# Patient Record
Sex: MALE | Race: WHITE | Employment: FULL TIME | URBAN - METROPOLITAN AREA
[De-identification: names, ages, dates, MRNs, and addresses within clinical notes are randomized per-mention and may not be internally consistent; named-entity substitution may affect disease eponyms.]

---

## 2022-08-10 ENCOUNTER — HOSPITAL ENCOUNTER (EMERGENCY)
Age: 27
Discharge: HOME | End: 2022-08-11
Attending: EMERGENCY MEDICINE

## 2022-08-10 DIAGNOSIS — I48.91 ATRIAL FIBRILLATION WITH RAPID VENTRICULAR RESPONSE (HCC): Primary | ICD-10-CM

## 2022-08-10 DIAGNOSIS — R42 LIGHTHEADEDNESS: ICD-10-CM

## 2022-08-10 LAB
BASOPHILS ABSOLUTE: 0.1 K/UL (ref 0–0.2)
BASOPHILS RELATIVE PERCENT: 0.9 %
EOSINOPHILS ABSOLUTE: 0.2 K/UL (ref 0–0.6)
EOSINOPHILS RELATIVE PERCENT: 1.6 %
HCT VFR BLD CALC: 48.6 % (ref 40.5–52.5)
HEMOGLOBIN: 17.2 G/DL (ref 13.5–17.5)
LYMPHOCYTES ABSOLUTE: 2.5 K/UL (ref 1–5.1)
LYMPHOCYTES RELATIVE PERCENT: 25.3 %
MCH RBC QN AUTO: 30.6 PG (ref 26–34)
MCHC RBC AUTO-ENTMCNC: 35.4 G/DL (ref 31–36)
MCV RBC AUTO: 86.4 FL (ref 80–100)
MONOCYTES ABSOLUTE: 0.8 K/UL (ref 0–1.3)
MONOCYTES RELATIVE PERCENT: 8.4 %
NEUTROPHILS ABSOLUTE: 6.2 K/UL (ref 1.7–7.7)
NEUTROPHILS RELATIVE PERCENT: 63.8 %
PDW BLD-RTO: 12.7 % (ref 12.4–15.4)
PLATELET # BLD: 231 K/UL (ref 135–450)
PMV BLD AUTO: 7.1 FL (ref 5–10.5)
RBC # BLD: 5.62 M/UL (ref 4.2–5.9)
WBC # BLD: 9.8 K/UL (ref 4–11)

## 2022-08-10 PROCEDURE — 96374 THER/PROPH/DIAG INJ IV PUSH: CPT

## 2022-08-10 PROCEDURE — 99285 EMERGENCY DEPT VISIT HI MDM: CPT

## 2022-08-10 PROCEDURE — 85025 COMPLETE CBC W/AUTO DIFF WBC: CPT

## 2022-08-10 PROCEDURE — 93005 ELECTROCARDIOGRAM TRACING: CPT | Performed by: EMERGENCY MEDICINE

## 2022-08-10 PROCEDURE — 84484 ASSAY OF TROPONIN QUANT: CPT

## 2022-08-10 PROCEDURE — 84443 ASSAY THYROID STIM HORMONE: CPT

## 2022-08-10 PROCEDURE — 96361 HYDRATE IV INFUSION ADD-ON: CPT

## 2022-08-10 PROCEDURE — 82077 ASSAY SPEC XCP UR&BREATH IA: CPT

## 2022-08-10 PROCEDURE — 80053 COMPREHEN METABOLIC PANEL: CPT

## 2022-08-10 PROCEDURE — 36415 COLL VENOUS BLD VENIPUNCTURE: CPT

## 2022-08-10 PROCEDURE — 83735 ASSAY OF MAGNESIUM: CPT

## 2022-08-10 ASSESSMENT — PAIN - FUNCTIONAL ASSESSMENT: PAIN_FUNCTIONAL_ASSESSMENT: NONE - DENIES PAIN

## 2022-08-10 NOTE — Clinical Note
Aminah Merino was seen and treated in our emergency department on 8/10/2022. He may return to work on 08/13/2022. May return sooner if feeling better      If you have any questions or concerns, please don't hesitate to call.       Tika Tiwari MD

## 2022-08-10 NOTE — Clinical Note
Dima Campa was seen and treated in our emergency department on 8/10/2022. He may return to work on 08/13/2022. May return sooner if feeling better      If you have any questions or concerns, please don't hesitate to call.       Dimple Mitchell MD

## 2022-08-11 ENCOUNTER — APPOINTMENT (OUTPATIENT)
Dept: GENERAL RADIOLOGY | Age: 27
End: 2022-08-11

## 2022-08-11 VITALS
RESPIRATION RATE: 16 BRPM | SYSTOLIC BLOOD PRESSURE: 129 MMHG | HEART RATE: 82 BPM | TEMPERATURE: 98.5 F | DIASTOLIC BLOOD PRESSURE: 82 MMHG | OXYGEN SATURATION: 98 % | HEIGHT: 71 IN | WEIGHT: 200 LBS | BODY MASS INDEX: 28 KG/M2

## 2022-08-11 LAB
A/G RATIO: 1.9 (ref 1.1–2.2)
ALBUMIN SERPL-MCNC: 5 G/DL (ref 3.4–5)
ALP BLD-CCNC: 96 U/L (ref 40–129)
ALT SERPL-CCNC: 49 U/L (ref 10–40)
ANION GAP SERPL CALCULATED.3IONS-SCNC: 13 MMOL/L (ref 3–16)
AST SERPL-CCNC: 27 U/L (ref 15–37)
BILIRUB SERPL-MCNC: 1 MG/DL (ref 0–1)
BUN BLDV-MCNC: 13 MG/DL (ref 7–20)
CALCIUM SERPL-MCNC: 10 MG/DL (ref 8.3–10.6)
CHLORIDE BLD-SCNC: 105 MMOL/L (ref 99–110)
CO2: 24 MMOL/L (ref 21–32)
CREAT SERPL-MCNC: 1 MG/DL (ref 0.9–1.3)
EKG ATRIAL RATE: 159 BPM
EKG DIAGNOSIS: NORMAL
EKG Q-T INTERVAL: 302 MS
EKG QRS DURATION: 86 MS
EKG QTC CALCULATION (BAZETT): 475 MS
EKG R AXIS: 63 DEGREES
EKG T AXIS: 14 DEGREES
EKG VENTRICULAR RATE: 149 BPM
ETHANOL: NORMAL MG/DL (ref 0–0.08)
GFR AFRICAN AMERICAN: >60
GFR NON-AFRICAN AMERICAN: >60
GLUCOSE BLD-MCNC: 103 MG/DL (ref 70–99)
MAGNESIUM: 2.2 MG/DL (ref 1.8–2.4)
POTASSIUM REFLEX MAGNESIUM: 3.7 MMOL/L (ref 3.5–5.1)
SODIUM BLD-SCNC: 142 MMOL/L (ref 136–145)
TOTAL PROTEIN: 7.7 G/DL (ref 6.4–8.2)
TROPONIN: <0.01 NG/ML
TSH SERPL DL<=0.05 MIU/L-ACNC: 4.73 UIU/ML (ref 0.27–4.2)

## 2022-08-11 PROCEDURE — 2500000003 HC RX 250 WO HCPCS: Performed by: EMERGENCY MEDICINE

## 2022-08-11 PROCEDURE — 6370000000 HC RX 637 (ALT 250 FOR IP): Performed by: EMERGENCY MEDICINE

## 2022-08-11 PROCEDURE — 71045 X-RAY EXAM CHEST 1 VIEW: CPT

## 2022-08-11 PROCEDURE — 2580000003 HC RX 258: Performed by: EMERGENCY MEDICINE

## 2022-08-11 PROCEDURE — 93010 ELECTROCARDIOGRAM REPORT: CPT | Performed by: INTERNAL MEDICINE

## 2022-08-11 PROCEDURE — 99285 EMERGENCY DEPT VISIT HI MDM: CPT

## 2022-08-11 RX ORDER — METOPROLOL TARTRATE 50 MG/1
50 TABLET, FILM COATED ORAL ONCE
Status: COMPLETED | OUTPATIENT
Start: 2022-08-11 | End: 2022-08-11

## 2022-08-11 RX ORDER — ASPIRIN 81 MG/1
324 TABLET, CHEWABLE ORAL ONCE
Status: COMPLETED | OUTPATIENT
Start: 2022-08-11 | End: 2022-08-11

## 2022-08-11 RX ORDER — ASPIRIN 81 MG/1
81 TABLET, CHEWABLE ORAL DAILY
Qty: 30 TABLET | Refills: 0 | Status: SHIPPED | OUTPATIENT
Start: 2022-08-11 | End: 2022-09-10

## 2022-08-11 RX ORDER — DILTIAZEM HYDROCHLORIDE 5 MG/ML
10 INJECTION INTRAVENOUS ONCE
Status: COMPLETED | OUTPATIENT
Start: 2022-08-11 | End: 2022-08-11

## 2022-08-11 RX ORDER — 0.9 % SODIUM CHLORIDE 0.9 %
1000 INTRAVENOUS SOLUTION INTRAVENOUS ONCE
Status: COMPLETED | OUTPATIENT
Start: 2022-08-11 | End: 2022-08-11

## 2022-08-11 RX ADMIN — SODIUM CHLORIDE 1000 ML: 9 INJECTION, SOLUTION INTRAVENOUS at 01:10

## 2022-08-11 RX ADMIN — METOPROLOL TARTRATE 50 MG: 50 TABLET, FILM COATED ORAL at 02:15

## 2022-08-11 RX ADMIN — ASPIRIN 324 MG: 81 TABLET, CHEWABLE ORAL at 03:14

## 2022-08-11 RX ADMIN — DILTIAZEM HYDROCHLORIDE 10 MG: 5 INJECTION INTRAVENOUS at 01:09

## 2022-08-11 ASSESSMENT — ENCOUNTER SYMPTOMS
SHORTNESS OF BREATH: 0
VOMITING: 0
CHEST TIGHTNESS: 0
COUGH: 0
COLOR CHANGE: 0
CONSTIPATION: 0
ABDOMINAL PAIN: 0
DIARRHEA: 1
NAUSEA: 0
ABDOMINAL DISTENTION: 0

## 2022-08-11 ASSESSMENT — PAIN - FUNCTIONAL ASSESSMENT: PAIN_FUNCTIONAL_ASSESSMENT: NONE - DENIES PAIN

## 2022-08-11 NOTE — ED NOTES
Called cardiology at [address] [place] [address]. Duane Price is supposed to return call.       Morena Toth  43/78/90 0235

## 2022-08-11 NOTE — ED PROVIDER NOTES
[address] ED  EMERGENCY DEPARTMENT ENCOUNTER        Pt Name: Fatimah Holt  MRN: [id number]  [place] 1995  Date of evaluation: 8/10/2022  Provider: Ismael Bangura MD  PCP: Lucho Espinal MD      CHIEF COMPLAINT       Chief Complaint   Patient presents with    Irregular Heart Beat     Pt noticed fast HR that started today. EKG shows Afib RVR in triage. No hx of Afib. HISTORY OFPRESENT ILLNESS   (Location/Symptom, Timing/Onset, Context/Setting, Quality, Duration, Modifying Factors,Severity)  Note limiting factors. Fatimah Holt is a 33 y.o. male presenting today due to concern for noticing that his heart was racing throughout the entire working on his car although not realizing something was abnormal until roughly 10 PM this evening and reportedly being on his car for 4 hours prior to that. He does not think that the sensation of his heart fluttering happen any sooner than today. He denies any history of atrial fibrillation. 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 He reports having a grandfather with a bicuspid aortic valve. He did admit to drinking 6 beers yesterday on his off day but denies drinking on a regular basis. He denies any chest pain or shortness of breath but did feel lightheaded when standing prior to coming to the ED. He denies any syncope. He denies any numbness or weakness in the arms or legs. He denies any vomiting. He denies any headache. He did have some diarrhea recently. He denies any fever. Due to overall not feeling well this evening with the palpitations, he came to the ED for further evaluation. REVIEW OF SYSTEMS    (2-9 systems for level 4, 10 or more for level 5)     Review of Systems   Constitutional:  Negative for chills, diaphoresis, fatigue and fever. HENT:  Negative for congestion. Eyes:  Negative for visual disturbance.    Respiratory:  Negative for cough, chest tightness and shortness of breath. Cardiovascular:  Positive for palpitations. Negative for chest pain and leg swelling. Gastrointestinal:  Positive for diarrhea. Negative for abdominal distention, abdominal pain, constipation, nausea and vomiting. Genitourinary:  Negative for dysuria. Musculoskeletal:  Negative for neck pain. Skin:  Negative for color change and wound. Neurological:  Positive for light-headedness. Negative for dizziness, syncope, weakness, numbness and headaches. Psychiatric/Behavioral:  Negative for confusion. The patient is not nervous/anxious. Positives and Pertinent negatives as per HPI. PASTMEDICAL HISTORY   History reviewed. No pertinent past medical history. SURGICAL HISTORY       Past Surgical History:   Procedure Laterality Date    ANKLE SURGERY Right          CURRENT MEDICATIONS       Discharge Medication List as of 8/11/2022  3:11 AM          ALLERGIES     Diltiazem hcl    FAMILY HISTORY     History reviewed. No pertinent family history. SOCIAL HISTORY       Social History     Socioeconomic History    Marital status: Single     Spouse name: None    Number of children: None    Years of education: None    Highest education level: None   Tobacco Use    Smoking status: Never    Smokeless tobacco: Current   Vaping Use    Vaping Use: Never used   Substance and Sexual Activity    Alcohol use: No    Drug use: No       SCREENINGS    [place] Coma Scale  Eye Opening: Spontaneous  Best Verbal Response: Oriented  Best Motor Response: Obeys commands  [place] Coma Scale Score: 15           PHYSICAL EXAM    (up to 7 for level 4, 8 or more for level 5)     ED Triage Vitals [08/10/22 2315]   BP Temp Temp Source Heart Rate Resp SpO2 Height Weight   121/83 98.5 °F (36.9 °C) Oral (!) 149 18 98 % 5' 11\" (1.803 m) 200 lb (90.7 kg)       Physical Exam  Vitals and nursing note reviewed. Constitutional:       General: He is not in acute distress.      Appearance: Normal appearance. He is well-developed. He is not ill-appearing, toxic-appearing or diaphoretic. Interventions: He is not intubated. HENT:      Head: Normocephalic and atraumatic. Right Ear: External ear normal.      Left Ear: External ear normal.      Nose: Nose normal.      Mouth/Throat:      Mouth: Mucous membranes are moist.   Eyes:      General: No scleral icterus. Right eye: No discharge. Left eye: No discharge. Conjunctiva/sclera: Conjunctivae normal.      Pupils: Pupils are equal, round, and reactive to light. Neck:      Trachea: Trachea and phonation normal. No tracheal deviation. Cardiovascular:      Rate and Rhythm: Tachycardia present. Rhythm irregularly irregular. Pulses: Normal pulses. Heart sounds: Normal heart sounds. No murmur heard. No friction rub. No gallop. Pulmonary:      Effort: Pulmonary effort is normal. No tachypnea, bradypnea, accessory muscle usage, prolonged expiration, respiratory distress or retractions. He is not intubated. Breath sounds: Normal breath sounds and air entry. No stridor. No decreased breath sounds, wheezing, rhonchi or rales. Chest:      Chest wall: No tenderness. Abdominal:      General: Abdomen is flat. Bowel sounds are normal. There is no distension. Palpations: Abdomen is soft. Tenderness: There is no abdominal tenderness. There is no guarding or rebound. Musculoskeletal:         General: No tenderness, deformity or signs of injury. Normal range of motion. Cervical back: Full passive range of motion without pain, normal range of motion and neck supple. No edema, erythema, signs of trauma, rigidity, torticollis or crepitus. No pain with movement, spinous process tenderness or muscular tenderness. Normal range of motion. Right lower leg: No edema. Left lower leg: No edema. Skin:     General: Skin is warm and dry. Coloration: Skin is not jaundiced or pale.       Findings: No erythema or rash. Neurological:      General: No focal deficit present. Mental Status: He is alert and oriented to person, place, and time. Mental status is at baseline. GCS: GCS eye subscore is 4. GCS verbal subscore is 5. GCS motor subscore is 6. Cranial Nerves: No dysarthria or facial asymmetry. Sensory: Sensation is intact. Motor: Motor function is intact. No weakness, tremor, atrophy, abnormal muscle tone or seizure activity. Comments: Moving all extremities equally   Psychiatric:         Attention and Perception: Attention normal.         Mood and Affect: Mood and affect normal. Mood is not anxious. Speech: Speech normal. Speech is not delayed or slurred. Behavior: Behavior normal. Behavior is cooperative. DIAGNOSTIC RESULTS   :    Labs Reviewed   COMPREHENSIVE METABOLIC PANEL W/ REFLEX TO MG FOR LOW K - Abnormal; Notable for the following components:       Result Value    Glucose 103 (*)     ALT 49 (*)     All other components within normal limits   TSH - Abnormal; Notable for the following components:    TSH 4.73 (*)     All other components within normal limits   CBC WITH AUTO DIFFERENTIAL   TROPONIN   MAGNESIUM   ETHANOL       All other labs were within normal range or not returned asof this dictation. EKG: All EKG's are interpreted by the Emergency Department Physician who either signs or Co-signs this chart in the absence of a cardiologist.    The Ekg interpreted by me shows  atrial fibrillation with a rate of 149  Axis is   Normal  QTc is  within an acceptable range  Intervals and Durations are unremarkable.       ST Segments: nonspecific changes  No significant change from prior EKG dated - no old EKG  No STEMI         RADIOLOGY:   Non-plain film images such as CT, Ultrasound and MRI are read by the radiologist. Plainradiographic images are visualized and preliminarily interpreted by the  ED Provider with the belowfindings:        Interpretation per the Radiologist below, if available at the time of this note:    XR CHEST PORTABLE   Final Result   No acute process. PROCEDURES   Unless otherwise noted below, none     Procedures    CRITICAL CARE TIME   Time: 30 minutes  Includes repeat examinations, speaking with consultants, lab interpretation, charting, treating for afib with rvr needing IV fluids and IV diltiazem   Excludes separate billable procedures. Patient at risk for serious decompensation if not treated for this life-threatening condition. CONSULTS: Spoke with NP Filiberto He and he recommended 25 mg metoprolol twice daily but no need for anticoagulation and cardiology will get the patient in urgently into their office over the next couple of days. IP CONSULT TO CARDIOLOGY    EMERGENCY DEPARTMENT COURSE and DIFFERENTIAL DIAGNOSIS/MDM:   Vitals:    Vitals:    08/11/22 0307 08/11/22 0308 08/11/22 0309 08/11/22 0317   BP:    129/82   Pulse: 85 92 84 82   Resp: 19 17  16   Temp:       TempSrc:       SpO2:    98%   Weight:       Height:           Patient was given the following medications:  Medications   dilTIAZem injection 10 mg (10 mg IntraVENous Given 8/11/22 0109)   0.9 % sodium chloride bolus (0 mLs IntraVENous Stopped 8/11/22 0310)   metoprolol tartrate (LOPRESSOR) tablet 50 mg (50 mg Oral Given 8/11/22 0215)   aspirin chewable tablet 324 mg (324 mg Oral Given 8/11/22 0314)     Patient was evaluated due to having racing heart associate with lightheadedness starting this evening. No concern for any strokelike symptoms. He has never had atrial fibrillation before but EKG did confirm this. He did receive IV fluids along with IV diltiazem but after receiving the diltiazem he did have signs of streaking erythema up the arm concerning for possible allergic reaction. He had no concern for angioedema or other allergic reaction symptoms and it did resolve once the medication stopped.   I did speak to cardiology who agreed that he can be discharged with close follow-up as an outpatient and no need for admission. They did recommend metoprolol. Based on [id number] 2 score, no need for anticoagulation but I did start him on a baby aspirin. He is aware that if he develops any chest pain or shortness of breath, unilateral numbness or weakness, lightheadedness with syncope, then return immediately to the ED for further evaluation, but otherwise follow-up with cardiology over the next couple of days for repeat evaluation. Cardiology does plan to call him in the morning but he was also told to call the office as well today to ensure quick follow-up. He was well-appearing and in no acute distress at time of discharge and felt comfortable with this plan. The patient tolerated their visit well. The patient and / or the family were informed of the results of any tests, a time was given to answer questions. FINAL IMPRESSION      1. Atrial fibrillation with rapid ventricular response ([address]    2. Lightheadedness          DISPOSITION/PLAN   DISPOSITION Decision To Discharge 08/11/2022 03:04:34 AM      PATIENT REFERRED TO:  [facility] [place]  [address] [facility]  [phone]  Go to   If symptoms worsen    Daniel Moreno MD  [address] [address] [phone]    In 2 days      Wilton Smith MD  [address] [place] [address]  [phone]    Call today      DISCHARGEMEDICATIONS:  Discharge Medication List as of 8/11/2022  3:11 AM        START taking these medications    Details   metoprolol tartrate (LOPRESSOR) 25 MG tablet Take 1 tablet by mouth in the morning and 1 tablet before bedtime. Do all this for 14 days. , Disp-28 tablet, R-0Print      aspirin (ASPIRIN CHILDRENS) 81 MG chewable tablet Take 1 tablet by mouth in the morning., Disp-30 tablet, R-0Print             DISCONTINUED MEDICATIONS:  Discharge Medication List as of 8/11/2022  3:11 AM        STOP taking these medications       ibuprofen (ADVIL;MOTRIN) 600 MG tablet Comments:   Reason for Stopping:                      (Please note that portions of this note were completed with a voicerecognition program.  Efforts were made to edit the dictations but occasionally words are mis-transcribed.)    Ani Corral MD (electronically signed)            Ani Corral MD  08/11/22 2011

## 2022-08-11 NOTE — ED NOTES
After receiving IV diltiazem, patient had redness up his arm to his chest. Patient c/o some burning at the site. IV is still patent, flushes well and has a brisk blood return. Dr. Alphonso Tolliver at bedside for evaluation. Dr. Alphonso Tolliver asked that cardizem be added to the patient's list of allergies. IV fluids resumed, redness is improving.       Nohemi Johnston, ALEKSANDRA  08/11/22 0151

## 2022-08-11 NOTE — ED NOTES
Patient reports his heart rate increased to 150, he felt lightheaded and SOB. Patient heart rate remains irregular and fluctuates between 120-150 bpm. Dr. Patel Haddad made aware of patient complaint.       Jennifer Wooten RN  08/11/22 0020

## 2022-08-24 ENCOUNTER — TELEPHONE (OUTPATIENT)
Dept: CARDIOLOGY CLINIC | Age: 27
End: 2022-08-24

## 2022-08-24 NOTE — TELEPHONE ENCOUNTER
From [address] [place],[address]- Please help me get patient 2 week monitor and follow up with EPMD.  Thanks. Attempted to reach patient. LVM for call back. Need to know if he would like monitor mailed or to come in to have placed.      Also schedule with [facility] as a new patient in 1-2 months when he calls back

## 2022-08-24 NOTE — TELEPHONE ENCOUNTER
----- Message from Adriano Ramirez MD sent at 8/24/2022 12:01 AM EDT -----        ----- Message -----  From: Dariela Burt MD  Sent: 8/11/2022   3:26 AM EDT  To: Adriano Ramirez MD

## 2022-10-14 NOTE — TELEPHONE ENCOUNTER
Attempted to call pt to relay message. Pt answered the phone, but after introducing myself pt promptly hung up the phone. Given our multiple attempts to contact pt, I will close encounter.

## 2023-05-05 ENCOUNTER — HOSPITAL ENCOUNTER (EMERGENCY)
Age: 28
Discharge: HOME | End: 2023-05-05
Attending: STUDENT IN AN ORGANIZED HEALTH CARE EDUCATION/TRAINING PROGRAM
Payer: COMMERCIAL

## 2023-05-05 VITALS
HEIGHT: 71 IN | SYSTOLIC BLOOD PRESSURE: 159 MMHG | RESPIRATION RATE: 16 BRPM | TEMPERATURE: 98.5 F | HEART RATE: 71 BPM | WEIGHT: 220 LBS | DIASTOLIC BLOOD PRESSURE: 99 MMHG | OXYGEN SATURATION: 96 % | BODY MASS INDEX: 30.8 KG/M2

## 2023-05-05 DIAGNOSIS — J01.10 ACUTE FRONTAL SINUSITIS, RECURRENCE NOT SPECIFIED: ICD-10-CM

## 2023-05-05 DIAGNOSIS — H66.90 ACUTE OTITIS MEDIA, UNSPECIFIED OTITIS MEDIA TYPE: Primary | ICD-10-CM

## 2023-05-05 PROCEDURE — 99283 EMERGENCY DEPT VISIT LOW MDM: CPT

## 2023-05-05 RX ORDER — GUAIFENESIN 600 MG/1
600 TABLET, EXTENDED RELEASE ORAL 2 TIMES DAILY
Qty: 30 TABLET | Refills: 0 | Status: SHIPPED | OUTPATIENT
Start: 2023-05-05 | End: 2023-05-20

## 2023-05-05 RX ORDER — AMOXICILLIN AND CLAVULANATE POTASSIUM 875; 125 MG/1; MG/1
1 TABLET, FILM COATED ORAL 2 TIMES DAILY
Qty: 14 TABLET | Refills: 0 | Status: SHIPPED | OUTPATIENT
Start: 2023-05-05 | End: 2023-05-12

## 2023-05-05 ASSESSMENT — PAIN - FUNCTIONAL ASSESSMENT: PAIN_FUNCTIONAL_ASSESSMENT: 0-10

## 2023-05-05 ASSESSMENT — PAIN SCALES - GENERAL: PAINLEVEL_OUTOF10: 0

## 2023-05-05 NOTE — DISCHARGE INSTRUCTIONS
You were seen today for ear pain and sinus pressure. As we discussed, ear infections are often viral in nature. You have been provided a paper prescription for an antibiotic. We recommend that if your symptoms are not improving within the next couple days or you are having worsening symptoms that you start the antibiotic.   You have also been prescribed a decongestant

## 2023-05-05 NOTE — ED NOTES
Pt ok to d/c to home. Pt given d/c instructions. Pt verbalized understating including Rx and follow up care. Pt ambulated to [facility] for ride home.  0 s/s of distress at time of d/c.          Chioma Alvarez RN  05/05/23 4339

## 2023-05-08 NOTE — ED PROVIDER NOTES
symptoms occur. We have discussed the symptoms which are most concerning that necessitate immediate return. CLINICAL IMPRESSION  1. Acute otitis media, unspecified otitis media type    2. Acute frontal sinusitis, recurrence not specified        Blood pressure (!) 159/99, pulse 71, temperature 98.5 °F (36.9 °C), temperature source Oral, resp. rate 16, height 5' 11\" (1.803 m), weight 220 lb (99.8 kg), SpO2 96 %. Randee John was discharged to home in stable condition. Patient was given scripts for the following medications. I counseled patient how to take these medications. Discharge Medication List as of 5/5/2023  3:22 AM        START taking these medications    Details   guaiFENesin (MUCINEX) 600 MG extended release tablet Take 1 tablet by mouth 2 times daily for 15 days, Disp-30 tablet, R-0Normal      amoxicillin-clavulanate (AUGMENTIN) 875-125 MG per tablet Take 1 tablet by mouth 2 times daily for 7 days, Disp-14 tablet, R-0Print             [facility]  [facility]  [phone]  Schedule an appointment as soon as possible for a visit       [facility]  ED  [address]  Go to   As needed, If symptoms worsen      DISCLAIMER: This chart was created using Dragon dictation software. Efforts were made by me to ensure accuracy, however some errors may be present due to limitations of this technology and occasionally words are not transcribed correctly.        Eboni Rothman MD  05/08/23 6498

## 2024-02-20 ENCOUNTER — OFFICE VISIT (OUTPATIENT)
Age: 29
End: 2024-02-20

## 2024-02-20 VITALS
HEART RATE: 90 BPM | TEMPERATURE: 98.6 F | OXYGEN SATURATION: 93 % | DIASTOLIC BLOOD PRESSURE: 91 MMHG | SYSTOLIC BLOOD PRESSURE: 137 MMHG

## 2024-02-20 DIAGNOSIS — R06.2 WHEEZE: ICD-10-CM

## 2024-02-20 DIAGNOSIS — J06.9 UPPER RESPIRATORY TRACT INFECTION, UNSPECIFIED TYPE: Primary | ICD-10-CM

## 2024-02-20 DIAGNOSIS — I10 HYPERTENSION, UNSPECIFIED TYPE: ICD-10-CM

## 2024-02-20 RX ORDER — METHYLPREDNISOLONE 4 MG/1
TABLET ORAL
Qty: 1 KIT | Refills: 0 | Status: SHIPPED | OUTPATIENT
Start: 2024-02-20 | End: 2024-02-26

## 2024-02-20 RX ORDER — BROMPHENIRAMINE MALEATE, PSEUDOEPHEDRINE HYDROCHLORIDE, AND DEXTROMETHORPHAN HYDROBROMIDE 2; 30; 10 MG/5ML; MG/5ML; MG/5ML
5 SYRUP ORAL 4 TIMES DAILY PRN
Qty: 120 ML | Refills: 0 | Status: SHIPPED | OUTPATIENT
Start: 2024-02-20

## 2024-02-20 RX ORDER — ALBUTEROL SULFATE 90 UG/1
2 AEROSOL, METERED RESPIRATORY (INHALATION) 4 TIMES DAILY PRN
Qty: 18 G | Refills: 0 | Status: SHIPPED | OUTPATIENT
Start: 2024-02-20

## 2024-02-20 ASSESSMENT — ENCOUNTER SYMPTOMS
COUGH: 1
SHORTNESS OF BREATH: 1
NAUSEA: 0
SORE THROAT: 0
DIARRHEA: 0
SINUS PRESSURE: 0
VOMITING: 0

## 2024-02-20 NOTE — PATIENT INSTRUCTIONS
Take medication as prescribed.   Rest and Increase fluids.  Stop vaping now for your best health.  We will call with results of chest xray.   Try taking a daily antihistamine such as Claritin or Zyrtec.   Follow up with your PCP in the next 1 week.

## 2024-05-03 ENCOUNTER — OFFICE VISIT (OUTPATIENT)
Age: 29
End: 2024-05-03

## 2024-05-03 VITALS
SYSTOLIC BLOOD PRESSURE: 146 MMHG | BODY MASS INDEX: 32.2 KG/M2 | TEMPERATURE: 98.3 F | HEART RATE: 84 BPM | OXYGEN SATURATION: 96 % | RESPIRATION RATE: 16 BRPM | WEIGHT: 230 LBS | DIASTOLIC BLOOD PRESSURE: 97 MMHG | HEIGHT: 71 IN

## 2024-05-03 DIAGNOSIS — R03.0 ELEVATED BLOOD PRESSURE READING IN OFFICE WITHOUT DIAGNOSIS OF HYPERTENSION: ICD-10-CM

## 2024-05-03 DIAGNOSIS — R30.0 DYSURIA: Primary | ICD-10-CM

## 2024-05-03 LAB
BILIRUBIN, POC: NORMAL
BLOOD URINE, POC: NORMAL
CLARITY, POC: CLEAR
COLOR, POC: YELLOW
GLUCOSE URINE, POC: NORMAL
KETONES, POC: NORMAL
LEUKOCYTE EST, POC: NORMAL
NITRITE, POC: NORMAL
PH, POC: 6.5
PROTEIN, POC: NORMAL
SPECIFIC GRAVITY, POC: 1.02
UROBILINOGEN, POC: 0.2

## 2024-05-03 RX ORDER — PHENAZOPYRIDINE HYDROCHLORIDE 200 MG/1
200 TABLET, FILM COATED ORAL 3 TIMES DAILY PRN
Qty: 6 TABLET | Refills: 0 | Status: SHIPPED | OUTPATIENT
Start: 2024-05-03 | End: 2024-05-05

## 2024-05-03 RX ORDER — NITROFURANTOIN 25; 75 MG/1; MG/1
100 CAPSULE ORAL 2 TIMES DAILY
Qty: 20 CAPSULE | Refills: 0 | Status: SHIPPED | OUTPATIENT
Start: 2024-05-03 | End: 2024-05-13

## 2024-05-03 NOTE — PROGRESS NOTES
Michael Lakhani (:  1995) is a 28 y.o. male,Established patient, here for evaluation of the following chief complaint(s):  Dysuria      ASSESSMENT/PLAN:    ICD-10-CM    1. Dysuria  R30.0 POCT Urinalysis no Micro     Culture, Urine     Trichomonas by EIA     C.trachomatis N.gonorrhoeae DNA, Urine ([place] Only)     nitrofurantoin, macrocrystal-monohydrate, (MACROBID) 100 MG capsule     phenazopyridine (PYRIDIUM) 200 MG tablet     CANCELED: POCT Rapid Strep A DNA ([place] i)      2. Elevated blood pressure reading in office without diagnosis of hypertension  R03.0         Keep hydrated, tylenol or ibuprofen (if no contraindications) as needed if pain or fever..  follow up in  7- days if not better  Return sooner   if symptoms worse/feeling worse or has new symptoms or concerns  If urine cultures are (-) advised to see urologist    Schedule follow up with PCP to have BP rechecked in 2-3 weeks    Schedule follow up with PCP to have BP rechecked  Information about blood pressure and non pharmacological approach to HTN (diet-weight management-exercise) provided            SUBJECTIVE/OBJECTIVE:  Patient presents with:  Dysuria x 1 week..self started amoxicillin for past 5 days  without improvement  No discharge,  no fever, no urinary frequency/urgency..   no testicular pain,  no local rash or blisters...no abdominal or back pain  No STD concerns         History provided by:  Patient   used: No    Dysuria   Pertinent negatives include no flank pain, frequency or hematuria.       Vitals:    24 1506 24 1515   BP: (!) 155/97 (!) 146/97   Site: Right Upper Arm    Position: Sitting    Cuff Size: Large Adult    Pulse: 84    Resp: 16    Temp: 98.3 °F (36.8 °C)    TempSrc: Oral    SpO2: 96%    Weight: 104.3 kg (230 lb)    Height: 1.803 m (5' 11\")        Review of Systems   Constitutional:  Negative for fever.   Genitourinary:  Positive for dysuria. Negative for flank pain, frequency,

## 2024-05-03 NOTE — PATIENT INSTRUCTIONS
Keep hydrated, tylenol or ibuprofen (if no contraindications) as needed if pain or fever..  follow up in  7- days if not better  Return sooner   if symptoms worse/feeling worse or has new symptoms or concerns  If urine cultures are (-) advised to see urologist    Schedule follow up with PCP to have BP rechecked in 2-3 weeks    Schedule follow up with PCP to have BP rechecked  Information about blood pressure and non pharmacological approach to HTN (diet-weight management-exercise) provided

## 2024-05-04 LAB
SPECIMEN TYPE: NORMAL
TRICHOMONAS VAGINALIS SCREEN: NEGATIVE

## 2024-05-04 ASSESSMENT — ENCOUNTER SYMPTOMS: BACK PAIN: 0

## 2024-05-05 LAB — BACTERIA UR CULT: NORMAL

## 2024-05-06 LAB
C TRACH DNA UR QL NAA+PROBE: NEGATIVE
N GONORRHOEA DNA UR QL NAA+PROBE: NEGATIVE

## 2025-07-30 ENCOUNTER — HOSPITAL ENCOUNTER (EMERGENCY)
Age: 30
Discharge: HOME | End: 2025-07-30
Payer: COMMERCIAL

## 2025-07-30 VITALS
OXYGEN SATURATION: 99 % | SYSTOLIC BLOOD PRESSURE: 156 MMHG | DIASTOLIC BLOOD PRESSURE: 100 MMHG | HEART RATE: 74 BPM | BODY MASS INDEX: 30.38 KG/M2 | HEIGHT: 71 IN | WEIGHT: 217 LBS | TEMPERATURE: 98.2 F | RESPIRATION RATE: 20 BRPM

## 2025-07-30 DIAGNOSIS — S05.01XA ABRASION OF RIGHT CORNEA, INITIAL ENCOUNTER: Primary | ICD-10-CM

## 2025-07-30 PROCEDURE — 6370000000 HC RX 637 (ALT 250 FOR IP): Performed by: PHYSICIAN ASSISTANT

## 2025-07-30 PROCEDURE — 99283 EMERGENCY DEPT VISIT LOW MDM: CPT

## 2025-07-30 RX ORDER — TETRACAINE HYDROCHLORIDE 5 MG/ML
1 SOLUTION OPHTHALMIC ONCE
Status: COMPLETED | OUTPATIENT
Start: 2025-07-30 | End: 2025-07-30

## 2025-07-30 RX ORDER — OXYCODONE AND ACETAMINOPHEN 5; 325 MG/1; MG/1
1 TABLET ORAL EVERY 6 HOURS PRN
Qty: 12 TABLET | Refills: 0 | Status: SHIPPED | OUTPATIENT
Start: 2025-07-30 | End: 2025-07-30

## 2025-07-30 RX ORDER — ONDANSETRON 2 MG/ML
4 INJECTION INTRAMUSCULAR; INTRAVENOUS ONCE
Status: DISCONTINUED | OUTPATIENT
Start: 2025-07-30 | End: 2025-07-31 | Stop reason: HOSPADM

## 2025-07-30 RX ORDER — ONDANSETRON 4 MG/1
4 TABLET, FILM COATED ORAL 3 TIMES DAILY PRN
Qty: 15 TABLET | Refills: 0 | Status: SHIPPED | OUTPATIENT
Start: 2025-07-30

## 2025-07-30 RX ORDER — OXYCODONE AND ACETAMINOPHEN 5; 325 MG/1; MG/1
1 TABLET ORAL EVERY 6 HOURS PRN
Qty: 12 TABLET | Refills: 0 | Status: SHIPPED | OUTPATIENT
Start: 2025-07-30 | End: 2025-08-02

## 2025-07-30 RX ORDER — OXYCODONE AND ACETAMINOPHEN 5; 325 MG/1; MG/1
1 TABLET ORAL ONCE
Refills: 0 | Status: COMPLETED | OUTPATIENT
Start: 2025-07-30 | End: 2025-07-30

## 2025-07-30 RX ORDER — ERYTHROMYCIN 5 MG/G
OINTMENT OPHTHALMIC ONCE
Status: COMPLETED | OUTPATIENT
Start: 2025-07-30 | End: 2025-07-30

## 2025-07-30 RX ORDER — ERYTHROMYCIN 5 MG/G
OINTMENT OPHTHALMIC
Qty: 1 G | Refills: 0 | Status: SHIPPED | OUTPATIENT
Start: 2025-07-30 | End: 2025-08-09

## 2025-07-30 RX ORDER — FLUORESCEIN SODIUM 1 MG/MG
1 STRIP OPHTHALMIC ONCE
Status: COMPLETED | OUTPATIENT
Start: 2025-07-30 | End: 2025-07-30

## 2025-07-30 RX ADMIN — TETRACAINE HYDROCHLORIDE 1 DROP: 5 SOLUTION OPHTHALMIC at 23:06

## 2025-07-30 RX ADMIN — ERYTHROMYCIN: 5 OINTMENT OPHTHALMIC at 23:07

## 2025-07-30 RX ADMIN — OXYCODONE HYDROCHLORIDE AND ACETAMINOPHEN 1 TABLET: 5; 325 TABLET ORAL at 22:17

## 2025-07-30 RX ADMIN — FLUORESCEIN SODIUM 1 MG: 1 STRIP OPHTHALMIC at 23:07

## 2025-07-30 RX ADMIN — OXYCODONE HYDROCHLORIDE AND ACETAMINOPHEN 1 TABLET: 5; 325 TABLET ORAL at 23:21

## 2025-07-30 ASSESSMENT — PAIN DESCRIPTION - ORIENTATION
ORIENTATION: RIGHT

## 2025-07-30 ASSESSMENT — PAIN SCALES - GENERAL
PAINLEVEL_OUTOF10: 10
PAINLEVEL_OUTOF10: 9
PAINLEVEL_OUTOF10: 7

## 2025-07-30 ASSESSMENT — PAIN DESCRIPTION - LOCATION
LOCATION: EYE

## 2025-07-30 ASSESSMENT — PAIN DESCRIPTION - DESCRIPTORS
DESCRIPTORS: SHOOTING;SHARP
DESCRIPTORS: SHOOTING;SHARP

## 2025-07-30 ASSESSMENT — LIFESTYLE VARIABLES
HOW OFTEN DO YOU HAVE A DRINK CONTAINING ALCOHOL: 2-3 TIMES A WEEK
HOW MANY STANDARD DRINKS CONTAINING ALCOHOL DO YOU HAVE ON A TYPICAL DAY: 1 OR 2

## 2025-07-30 ASSESSMENT — PAIN - FUNCTIONAL ASSESSMENT: PAIN_FUNCTIONAL_ASSESSMENT: 0-10

## 2025-07-31 NOTE — DISCHARGE INSTRUCTIONS
You can use OTC Tylenol every 4-6 hours and Motrin every 8 hours as needed for pain control.  Take medications as prescribed.  Follow-up with optometrist in a couple days for further evaluation.

## 2025-07-31 NOTE — ED NOTES
Discharge with instructions and follow up with Ophthalmology informed, patient and family informed. Left stable

## 2025-08-01 NOTE — ED PROVIDER NOTES
[facility] Emergency Department    CHIEF COMPLAINT  Eye Injury (Right eye injury. Pt was loading a  onto the truck- the nylon rope released and hit pt in eye. Pt denies and vision loss but blurry. )      SHARED SERVICE VISIT  Evaluated by JAK.  My supervising physician was available for consultation.    HISTORY OF PRESENT ILLNESS  Michael Lakhani is a 30 y.o. male who presents to the ED complaining of right eye pain.  He was unloading a lawnmower from the bed of his truck earlier today when the nylon rope snapped and hit him in the right eye.  Ever since then he is experiencing extreme pain to the right eye.  He denies any vision loss but does admit that his vision is slightly blurry.  Came straight to the emergency department for further evaluation. Denies any headache, body ache, fevers or chills.  Denies any coughing or sneezing.  Denies any sore throat or congestion.  Denies any dizziness.  Denies any chest pain, shortness of breath, or dyspnea on exertion.  Denies any nausea, vomiting, or abdominal pain.  Denies any urinary symptoms.  Denies any diarrhea or bloody stools.  Denies any new onset back pain.  Denies any recent travel or sick contacts.    No other complaints, modifying factors or associated symptoms.     Nursing notes reviewed.   No past medical history on file.  Past Surgical History:   Procedure Laterality Date    ANKLE SURGERY Right      No family history on file.  Social History     Socioeconomic History    Marital status: Single     Spouse name: Not on file    Number of children: Not on file    Years of education: Not on file    Highest education level: Not on file   Occupational History    Not on file   Tobacco Use    Smoking status: Never    Smokeless tobacco: Current   Vaping Use    Vaping status: Never Used   Substance and Sexual Activity    Alcohol use: No    Drug use: No    Sexual activity: Not on file   Other Topics Concern    Not on file   Social